# Patient Record
Sex: FEMALE | Race: BLACK OR AFRICAN AMERICAN | Employment: UNEMPLOYED | URBAN - METROPOLITAN AREA
[De-identification: names, ages, dates, MRNs, and addresses within clinical notes are randomized per-mention and may not be internally consistent; named-entity substitution may affect disease eponyms.]

---

## 2020-12-30 ENCOUNTER — TRANSCRIBE ORDER (OUTPATIENT)
Dept: SCHEDULING | Age: 50
End: 2020-12-30

## 2020-12-30 DIAGNOSIS — R51.9 BILATERAL HEADACHES: Primary | ICD-10-CM

## 2021-02-04 ENCOUNTER — OFFICE VISIT (OUTPATIENT)
Dept: NEUROLOGY | Age: 51
End: 2021-02-04
Payer: COMMERCIAL

## 2021-02-04 VITALS
BODY MASS INDEX: 33.75 KG/M2 | OXYGEN SATURATION: 97 % | HEIGHT: 66 IN | DIASTOLIC BLOOD PRESSURE: 80 MMHG | WEIGHT: 210 LBS | SYSTOLIC BLOOD PRESSURE: 112 MMHG | HEART RATE: 98 BPM | RESPIRATION RATE: 16 BRPM

## 2021-02-04 DIAGNOSIS — G43.711 INTRACTABLE CHRONIC MIGRAINE WITHOUT AURA AND WITH STATUS MIGRAINOSUS: Primary | ICD-10-CM

## 2021-02-04 PROCEDURE — 99204 OFFICE O/P NEW MOD 45 MIN: CPT | Performed by: PSYCHIATRY & NEUROLOGY

## 2021-02-04 RX ORDER — AMITRIPTYLINE HYDROCHLORIDE 10 MG/1
TABLET, FILM COATED ORAL
COMMUNITY

## 2021-02-04 RX ORDER — SUMATRIPTAN 100 MG/1
100 TABLET, FILM COATED ORAL
Qty: 9 TAB | Refills: 1 | Status: SHIPPED | OUTPATIENT
Start: 2021-02-04 | End: 2021-02-04

## 2021-02-04 RX ORDER — TOPIRAMATE 50 MG/1
TABLET, FILM COATED ORAL 2 TIMES DAILY
COMMUNITY
End: 2021-02-04 | Stop reason: ALTCHOICE

## 2021-02-04 RX ORDER — SUMATRIPTAN 50 MG/1
50 TABLET, FILM COATED ORAL
COMMUNITY
End: 2021-02-04 | Stop reason: ALTCHOICE

## 2021-02-04 RX ORDER — METHYLPREDNISOLONE 4 MG/1
TABLET ORAL
Qty: 1 DOSE PACK | Refills: 0 | Status: SHIPPED | OUTPATIENT
Start: 2021-02-04

## 2021-02-04 NOTE — PROGRESS NOTES
Chief Complaint   Patient presents with    Migraine       HISTORY OF PRESENT ILLNESS  Chidi Franklin is a 44 y.o. female who came in for neurological consultation requested by her PCP. For the past 6 months or so she has been having headaches which have become very frequent lately. For the past several weeks she has had a headache almost on a daily basis. She keeps a mild headache in the background which often escalates to a sharp throbbing pain, bifrontal in location, associated with noise and light sensitivity. Sometimes she will see flashing lights during a headache. No auras and no other associated neurological symptoms. Uses sumatriptan which usually helps. Has been on topiramate 25 mg at bedtime and increase it to 50 mg at bedtime about a week ago. She has noticed that she feels very groggy if she takes it during the day and her thinking process has been affected on it. She is also taking amitriptyline 10 mg in the morning. She cannot think of any triggers except for stress. 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 She recently had a CT brain which was reportedly negative    History reviewed. No pertinent past medical history. Current Outpatient Medications   Medication Sig    amitriptyline (ELAVIL) 10 mg tablet Take  by mouth nightly.  topiramate ER (Trokendi XR) 100 mg capsule Take 1 Cap by mouth daily.  methylPREDNISolone (MEDROL DOSEPACK) 4 mg tablet Take as directed    SUMAtriptan (IMITREX) 100 mg tablet Take 1 Tab by mouth once as needed for Migraine for up to 1 dose. No current facility-administered medications for this visit. Allergies   Allergen Reactions    Morphine Hives    Sulfa (Sulfonamide Antibiotics) Hives     History reviewed. No pertinent family history. Social History     Tobacco Use    Smoking status: Never Smoker    Smokeless tobacco: Never Used   Substance Use Topics    Alcohol use: Yes    Drug use: Not on file     History reviewed.  No pertinent surgical history. REVIEW OF SYSTEMS  Review of Systems - History obtained from the patient  Psychological ROS: negative  ENT ROS: negative  Hematological and Lymphatic ROS: negative  Endocrine ROS: negative  Respiratory ROS: no cough, shortness of breath, or wheezing  Cardiovascular ROS: no chest pain or dyspnea on exertion  Gastrointestinal ROS: no abdominal pain, change in bowel habits, or black or bloody stools  Genito-Urinary ROS: no dysuria, trouble voiding, or hematuria  Musculoskeletal ROS: negative  Dermatological ROS: negative      PHYSICAL EXAMINATION:    Visit Vitals  /80   Pulse 98   Resp 16   Ht 5' 5.5\" (1.664 m)   Wt 210 lb (95.3 kg)   SpO2 97%   BMI 34.41 kg/m²     General:  Well nourished and groomed individual in no acute distress. Neck: Supple, nontender, no bruits, no pain with resistance to active range of motion. Heart: Regular rate and rhythm. Normal S1S2. Lungs:  Equal chest expansion, no cough, no wheeze  Musculoskeletal:  Extremities revealed no edema and had full range of motion of joints. Psych:  Good mood and bright affect    NEUROLOGICAL EXAMINATION:     Mental Status:   Alert and oriented to person, place, and time with recent and remote memory intact. Attention span and concentration are normal. Speech is fluent. Cranial Nerves:    II, III, IV, VI:  Visual acuity grossly intact. Visual fields are normal.    Pupils are equal, round, and reactive to light and accommodation. Extra-ocular movements are full and fluid. Fundoscopic exam was benign, no ptosis or nystagmus. V-XII: Hearing is grossly intact. Facial features are symmetric, with normal sensation and strength. The palate rises symmetrically and the tongue protrudes midline. Sternocleidomastoids 5/5. Motor Examination: Normal tone, bulk, and strength. 5/5 muscle strength throughout. No cogwheel rigidity or clonus present.       Sensory exam:  Normal throughout to pinprick, temperature, and vibration sense. Normal proprioception. Coordination:  Finger to nose and rapid arm movement testing was normal.   No resting or intention tremor    Gait and Station:  Steady while walking on toes, heels, and with tandem walking. Normal arm swing. No Rhomberg or pronator drift. No muscle wasting or fasiculations noted. Reflexes:  DTRs 2+ throughout. Toes downgoing. LABS / IMAGING  CT brain done at outside facility was reportedly negative    ASSESSMENT    ICD-10-CM ICD-9-CM    1. Intractable chronic migraine without aura and with status migrainosus  G43.711 346.73 topiramate ER (Trokendi XR) 100 mg capsule      methylPREDNISolone (MEDROL DOSEPACK) 4 mg tablet      SUMAtriptan (IMITREX) 100 mg tablet       DISCUSSION  Ms. Javier Mobley likely has chronic migraines and currently has a transformed migraine  The treatment strategies for migraines were reviewed at length including potential dietary and environmental triggers. she was advised to keep a headache log so as to identify and avoid these triggers. May use sumatriptan 100 mg with or without an NSAID for abortive therapy at the onset of headache. Dose can be repeated in 2 hours if needed. Avoid using them for more than 2 days per week to prevent analgesic overuse and rebound headache   Short course of steroid was given to break her current headache cycle  She should increase topiramate to extended release formulation 100 mg at bedtime due to poor tolerability and side effects noted with immediate release  If it does not make a difference, will consider alternatives and possibly CGRP inhibitors. Further specialized imaging may be needed depending upon clinical course    Thank you for allowing me to participate in the care of Ms. Bourne. Please feel free to contact me if you have any questions. I will be happy to follow to follow her along with you.     Lauren Banks MD  Diplomate, American Board of Psychiatry & Neurology (Neurology)  Arben Chawla Board of Psychiatry & Neurology (Clinical Neurophysiology)  Diplomate, American Board of Electrodiagnostic Medicine

## 2021-02-04 NOTE — PATIENT INSTRUCTIONS
PRESCRIPTION REFILL POLICY [facility] Statement to Patients April 1, 2014 In an effort to ensure the large volume of patient prescription refills is processed in the most efficient and expeditious manner, we are asking our patients to assist us by calling your Pharmacy for all prescription refills, this will include also your  Mail Order Pharmacy. The pharmacy will contact our office electronically to continue the refill process. Please do not wait until the last minute to call your pharmacy. We need at least 48 hours (2days) to fill prescriptions. We also encourage you to call your pharmacy before going to  your prescription to make sure it is ready. With regard to controlled substance prescription refill requests (narcotic refills) that need to be picked up at our office, we ask your cooperation by providing us with at least 72 hours (3days) notice that you will need a refill. We will not refill narcotic prescription refill requests after 4:00pm on any weekday, Monday through Thursday, or after 2:00pm on Fridays, or on the weekends. We encourage everyone to explore another way of getting your prescription refill request processed using [employer], our patient web portal through our electronic medical record system. [employer] is an efficient and effective way to communicate your medication request directly to the office and  downloadable as an yessica on your smart phone . [employer] also features a review functionality that allows you to view your medication list as well as leave messages for your physician. Are you ready to get connected? If so please review the attatched instructions or speak to any of our staff to get you set up right away! Thank you so much for your cooperation. Should you have any questions please contact our Practice Administrator. The Physicians and Staff,  [facility]

## 2021-02-04 NOTE — PROGRESS NOTES
Ms. Marisela Cohen presents as a new patient for evaluation of migraines. Depression screening done on this patient.

## 2021-02-05 ENCOUNTER — DOCUMENTATION ONLY (OUTPATIENT)
Dept: NEUROLOGY | Age: 51
End: 2021-02-05

## 2021-02-05 NOTE — PROGRESS NOTES
Records from PCP reviewed. CT brain normal.  CBC unremarkable. Lipid panel cholesterol 200, triglycerides 141, HDL 64, . LFTs and basic metabolic panel was normal with.

## 2021-07-29 ENCOUNTER — DOCUMENTATION ONLY (OUTPATIENT)
Dept: NEUROLOGY | Age: 51
End: 2021-07-29

## 2021-07-29 ENCOUNTER — OFFICE VISIT (OUTPATIENT)
Dept: NEUROLOGY | Age: 51
End: 2021-07-29
Payer: COMMERCIAL

## 2021-07-29 VITALS
BODY MASS INDEX: 33.75 KG/M2 | RESPIRATION RATE: 16 BRPM | HEART RATE: 81 BPM | DIASTOLIC BLOOD PRESSURE: 80 MMHG | OXYGEN SATURATION: 96 % | WEIGHT: 210 LBS | HEIGHT: 66 IN | SYSTOLIC BLOOD PRESSURE: 110 MMHG

## 2021-07-29 DIAGNOSIS — G43.709 CHRONIC MIGRAINE W/O AURA W/O STATUS MIGRAINOSUS, NOT INTRACTABLE: Primary | ICD-10-CM

## 2021-07-29 DIAGNOSIS — G43.711 INTRACTABLE CHRONIC MIGRAINE WITHOUT AURA AND WITH STATUS MIGRAINOSUS: ICD-10-CM

## 2021-07-29 PROCEDURE — 99214 OFFICE O/P EST MOD 30 MIN: CPT | Performed by: PSYCHIATRY & NEUROLOGY

## 2021-07-29 RX ORDER — GALCANEZUMAB 120 MG/ML
120 INJECTION, SOLUTION SUBCUTANEOUS
Qty: 1 SYRINGE | Refills: 1 | Status: SHIPPED | OUTPATIENT
Start: 2021-07-29 | End: 2021-07-29 | Stop reason: ALTCHOICE

## 2021-07-29 RX ORDER — SUMATRIPTAN 100 MG/1
100 TABLET, FILM COATED ORAL
COMMUNITY

## 2021-07-29 NOTE — PATIENT INSTRUCTIONS
[facility]   Statement to Patients  April 1, 2014      In an effort to ensure the large volume of patient prescription refills is processed in the most efficient and expeditious manner, we are asking our patients to assist us by calling your Pharmacy for all prescription refills, this will include also your  Mail Order Pharmacy. The pharmacy will contact our office electronically to continue the refill process. Please do not wait until the last minute to call your pharmacy. We need at least 48 hours (2days) to fill prescriptions. We also encourage you to call your pharmacy before going to  your prescription to make sure it is ready. With regard to controlled substance prescription refill requests (narcotic refills) that need to be picked up at our office, we ask your cooperation by providing us with at least 72 hours (3days) notice that you will need a refill. We will not refill narcotic prescription refill requests after 4:00pm on any weekday, Monday through Thursday, or after 2:00pm on Fridays, or on the weekends. We encourage everyone to explore another way of getting your prescription refill request processed using [employer], our patient web portal through our electronic medical record system. [employer] is an efficient and effective way to communicate your medication request directly to the office and  downloadable as an yessica on your smart phone . [employer] also features a review functionality that allows you to view your medication list as well as leave messages for your physician. Are you ready to get connected? If so please review the attatched instructions or speak to any of our staff to get you set up right away! Thank you so much for your cooperation. Should you have any questions please contact our Practice Administrator.     The Physicians and Staff,  [facility]

## 2021-07-29 NOTE — PROGRESS NOTES
Patient was unable to tolerate her second Emgality sample injection due to pain and fear of needles.

## 2021-07-29 NOTE — PROGRESS NOTES
Chief Complaint   Patient presents with    Migraine       HISTORY OF PRESENT ILLNESS  Idalia Mcclellan came back for a follow-up. She was last seen normal 6 months ago. She says that she was doing relatively well but over the last couple of months she has been having a lot more headaches, at least every other day. Take sumatriptan 100 mg which dulls the and sometimes does not take it away completely. Attributes these to stress as she has been working more than 40 extra hours per week due to being short staffed at [employer] office. She did have a CT brain done recently which was negative. Sometimes she will feel dizzy, somewhat disoriented doing a migraine and is not able to continue with her job duties in an efficient manner. Takes topiramate extended release 100 mg daily along with amitriptyline    RECAP For the past 6 months or so she has been having headaches which have become very frequent lately. For the past several weeks she has had a headache almost on a daily basis. She keeps a mild headache in the background which often escalates to a sharp throbbing pain, bifrontal in location, associated with noise and light sensitivity. Sometimes she will see flashing lights during a headache. No auras and no other associated neurological symptoms. Uses sumatriptan which usually helps. Has been on topiramate 25 mg at bedtime and increase it to 50 mg at bedtime about a week ago. She has noticed that she feels very groggy if she takes it during the day and her thinking process has been affected on it. She is also taking amitriptyline 10 mg in the morning. She cannot think of any triggers except for stress. 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 She recently had a CT brain which was reportedly negative    History reviewed. No pertinent past medical history. Current Outpatient Medications   Medication Sig    SUMAtriptan (IMITREX) 100 mg tablet Take 100 mg by mouth once as needed for Migraine.  galcanezumab-gnlm (Emgality Pen) 120 mg/mL injection 1 mL by SubCUTAneous route every thirty (30) days.  topiramate ER (Trokendi XR) 100 mg capsule Take 1 Cap by mouth daily.  amitriptyline (ELAVIL) 10 mg tablet Take  by mouth nightly. (Patient not taking: Reported on 7/29/2021)    methylPREDNISolone (MEDROL DOSEPACK) 4 mg tablet Take as directed (Patient not taking: Reported on 7/29/2021)     No current facility-administered medications for this visit. PHYSICAL EXAMINATION:    Visit Vitals  /80   Pulse 81   Resp 16   Ht 5' 5.5\" (1.664 m)   Wt 210 lb (95.3 kg)   SpO2 96%   BMI 34.41 kg/m²       NEUROLOGICAL EXAMINATION:     Mental Status:   Alert and oriented to person, place, and time. Attention span and concentration are normal. Speech is fluent . Cranial Nerves:    II, III, IV, VI:  Visual acuity grossly intact. Visual fields are normal.  Funduscopic exam was benign  Pupils are equal, round, and reactive. Extra-ocular movements are full and fluid. V-XII: Hearing is grossly intact. Facial features are symmetric, with normal sensation and strength. The palate rises symmetrically and the tongue protrudes midline. Motor Examination: Normal tone, bulk, and strength. Sensory exam:  Normal throughout     Coordination:  Finger to nose and rapid arm movement testing was normal.   No resting or intention tremor    Gait and Station:  Steady. Normal arm swing. No muscle wasting or fasiculations noted. LABS / IMAGING  CT brain was negative    ASSESSMENT    ICD-10-CM ICD-9-CM    1. Chronic migraine w/o aura w/o status migrainosus, not intractable  G43.709 346.70 galcanezumab-gnlm (Emgality Pen) 120 mg/mL injection       DISCUSSION  Ms. Rocio Gregory likely has chronic migraines and she continues to get at least 12-14 migraines per month   She has been on amitriptyline and topiramate for prophylaxis and these do not seem to be effective  I recommended switching over to Emgality monthly injections and a sample injection was given in the office today but she found it difficult to tolerate   We will increase her Trokendi to 200 mg daily  Continue amitriptyline for now  Continue sumatriptan with or without an NSAID for abortive therapy  Follow periodically      Edilson Berger MD  Diplomate, American Board of Psychiatry & Neurology (Neurology)  Fabrizio Barry of Psychiatry & Neurology (Clinical Neurophysiology)  Diplomate, American Board of Electrodiagnostic Medicine

## 2021-07-29 NOTE — PROGRESS NOTES
Ms. Dylan Garibay presents today to follow up migraines. She reported a migraine every other day which she attributes to work stress. Depression screening done on patient.

## 2021-08-04 ENCOUNTER — TELEPHONE (OUTPATIENT)
Dept: NEUROLOGY | Age: 51
End: 2021-08-04

## 2021-08-04 NOTE — TELEPHONE ENCOUNTER
Patient stated she lost the notes Dr. Jewel Mclaughlin gave her at her appt. She said one was giving her time off from work and the other stated she was here for an appt on 7/29.  Please print off again-she said she will pick it up

## 2021-08-04 NOTE — TELEPHONE ENCOUNTER
Per Dr. Anthony Reyes, We may give her a letter stating that she can remain off from work for up to 2 weeks. She has had very frequent migraines while working at [employer] office and working long hours was reportedly her trigger.

## 2021-08-04 NOTE — TELEPHONE ENCOUNTER
Informed patient letters are at . Dr. Mariposa Wick patient has decided to proceed with injectable. Please send to her local pharmacy.  Thanks

## 2021-08-04 NOTE — LETTER
8/4/2021 2:49 PM    Ms. Michael Bourne  [facility] [id number]              Sincerely,      Shashi Zuleta MD

## 2021-08-05 DIAGNOSIS — G43.709 CHRONIC MIGRAINE W/O AURA W/O STATUS MIGRAINOSUS, NOT INTRACTABLE: Primary | ICD-10-CM

## 2021-08-05 RX ORDER — GALCANEZUMAB 120 MG/ML
120 INJECTION, SOLUTION SUBCUTANEOUS
Qty: 1 ML | Refills: 3 | Status: SHIPPED | OUTPATIENT
Start: 2021-08-05 | End: 2021-12-31

## 2021-08-09 ENCOUNTER — TELEPHONE (OUTPATIENT)
Dept: NEUROLOGY | Age: 51
End: 2021-08-09

## 2021-08-10 ENCOUNTER — TELEPHONE (OUTPATIENT)
Dept: NEUROLOGY | Age: 51
End: 2021-08-10

## 2021-08-10 NOTE — TELEPHONE ENCOUNTER
Re: Yemi Shah PA request and [employer]cfang approval    Effective 07/11/21-08/10/22    Called pharmacy and left v/m.

## 2021-08-10 NOTE — TELEPHONE ENCOUNTER
Re: Emgality    [facility] PA request CMM, key# [id number]    [facility] approval effective 07/11/21-08/10/22. Called pharmacy to advise.

## 2021-12-01 ENCOUNTER — TELEPHONE (OUTPATIENT)
Dept: NEUROLOGY | Age: 51
End: 2021-12-01

## 2021-12-01 NOTE — TELEPHONE ENCOUNTER
Patient calling stating her migraines have been getting worse and she had to leave work early today because of it. She is requesting to be seen sooner than her already scheduled follow up appt on 2/25.  Please call

## 2021-12-02 NOTE — TELEPHONE ENCOUNTER
Patient called back, verified and advised to call back and check for cancellations as Dr. Gómez Robertson is booked into next year. Patient also advised that if the migraines worsen and are not letting up to proceed to urgent care or the ER to receive IV medication.

## 2021-12-28 DIAGNOSIS — G43.709 CHRONIC MIGRAINE W/O AURA W/O STATUS MIGRAINOSUS, NOT INTRACTABLE: ICD-10-CM

## 2021-12-31 RX ORDER — GALCANEZUMAB 120 MG/ML
INJECTION, SOLUTION SUBCUTANEOUS
Qty: 1 ML | Refills: 3 | Status: SHIPPED | OUTPATIENT
Start: 2021-12-31